# Patient Record
Sex: MALE | Race: BLACK OR AFRICAN AMERICAN | Employment: FULL TIME | URBAN - METROPOLITAN AREA
[De-identification: names, ages, dates, MRNs, and addresses within clinical notes are randomized per-mention and may not be internally consistent; named-entity substitution may affect disease eponyms.]

---

## 2017-04-05 ENCOUNTER — HOSPITAL ENCOUNTER (EMERGENCY)
Age: 27
Discharge: HOME | End: 2017-04-05
Attending: EMERGENCY MEDICINE
Payer: COMMERCIAL

## 2017-04-05 VITALS
HEART RATE: 77 BPM | DIASTOLIC BLOOD PRESSURE: 78 MMHG | OXYGEN SATURATION: 99 % | RESPIRATION RATE: 19 BRPM | SYSTOLIC BLOOD PRESSURE: 144 MMHG | WEIGHT: 230 LBS | BODY MASS INDEX: 36.1 KG/M2 | HEIGHT: 67 IN | TEMPERATURE: 98 F

## 2017-04-05 DIAGNOSIS — K08.89 PAIN, DENTAL: ICD-10-CM

## 2017-04-05 DIAGNOSIS — S02.5XXA CLOSED FRACTURE OF TOOTH, INITIAL ENCOUNTER: Primary | ICD-10-CM

## 2017-04-05 PROCEDURE — 74011000250 HC RX REV CODE- 250: Performed by: EMERGENCY MEDICINE

## 2017-04-05 PROCEDURE — 99283 EMERGENCY DEPT VISIT LOW MDM: CPT

## 2017-04-05 PROCEDURE — 74011250637 HC RX REV CODE- 250/637: Performed by: EMERGENCY MEDICINE

## 2017-04-05 RX ORDER — PENICILLIN V POTASSIUM 500 MG/1
500 TABLET, FILM COATED ORAL 4 TIMES DAILY
Qty: 28 TAB | Refills: 0 | Status: SHIPPED | OUTPATIENT
Start: 2017-04-05 | End: 2017-04-12

## 2017-04-05 RX ORDER — NAPROXEN 250 MG/1
250 TABLET ORAL ONCE
Status: DISCONTINUED | OUTPATIENT
Start: 2017-04-05 | End: 2017-04-05

## 2017-04-05 RX ORDER — HYDROCODONE BITARTRATE AND ACETAMINOPHEN 5; 325 MG/1; MG/1
1 TABLET ORAL
Qty: 12 TAB | Refills: 0 | Status: SHIPPED | OUTPATIENT
Start: 2017-04-05 | End: 2020-10-30

## 2017-04-05 RX ADMIN — LIDOCAINE HYDROCHLORIDE: 20 SOLUTION ORAL; TOPICAL at 11:26

## 2017-04-05 NOTE — ED NOTES
Patient (s)  given copy of dc instructions and 2 paper script(s) and 0 electronic scripts. Patient (s)  verbalized understanding of instructions and script (s). Patient given a current medication reconciliation form and verbalized understanding of their medications. Patient (s) verbalized understanding of the importance of discussing medications with his or her physician or clinic they will be following up with. Patient alert and oriented and in no acute distress. Patient offered wheelchair from treatment area to hospital entrance, patient denied wheelchair.

## 2017-04-05 NOTE — ED NOTES
Emergency Department Nursing Plan of Care       The Nursing Plan of Care is developed from the Nursing assessment and Emergency Department Attending provider initial evaluation. The plan of care may be reviewed in the ED Provider note.     The Plan of Care was developed with the following considerations:   Patient / Family readiness to learn indicated by:verbalized understanding  Persons(s) to be included in education: patient  Barriers to Learning/Limitations:No    Signed     Gricel Ramírez RN    4/5/2017   10:40 AM

## 2017-04-05 NOTE — ED PROVIDER NOTES
HPI Comments: Olga Villanueva is a 27 y.o. male presenting ambulatory to [facility] ED with c/o 10/10 left sided constant upper dental pain. The pt states that he chipped a tooth while eating popcorn a couple of weeks ago and has been experiencing increased pain in the tooth since then. He reports taking ibuprofen with no relief of his sx (last dose this morning). He states that he does not have a dentist that he sees regularly but notes that he will go to [facility] during walk in hours in a couple of days to have the tooth pulled. He denies having difficulty swallowing. Social History:  (-) Tobacco,   (-) EtOH,      (-) Drugs     There are no other complaints, changes, or physical findings at this time. The history is provided by the patient. History reviewed. No pertinent past medical history. History reviewed. No pertinent surgical history. History reviewed. No pertinent family history. Social History     Social History    Marital status: SINGLE     Spouse name: N/A    Number of children: N/A    Years of education: N/A     Occupational History    Not on file. Social History Main Topics    Smoking status: Never Smoker    Smokeless tobacco: Not on file    Alcohol use No    Drug use: No    Sexual activity: Yes     Partners: Male     Other Topics Concern    Not on file     Social History Narrative    No narrative on file         ALLERGIES: Sulfur    Review of Systems   Constitutional: Negative. Negative for chills and fever. HENT: Positive for dental problem. Negative for congestion, rhinorrhea and trouble swallowing. Eyes: Negative for visual disturbance. Respiratory: Negative for cough and shortness of breath. Cardiovascular: Negative for chest pain. Gastrointestinal: Negative for abdominal pain, diarrhea and vomiting. Genitourinary: Negative for difficulty urinating and dysuria. Musculoskeletal: Negative for arthralgias and back pain.    Skin: Negative for rash.   Neurological: Negative. Negative for dizziness, light-headedness and headaches. All other systems reviewed and are negative. Vitals:    04/05/17 1013 04/05/17 1132   BP: (!) 151/91 144/78   Pulse: 72 77   Resp: 19    Temp: 98 °F (36.7 °C)    SpO2: 99%    Weight: 104.3 kg (230 lb)    Height: 5' 7\" (1.702 m)             Physical Exam   Constitutional: He is oriented to person, place, and time. He appears well-developed and well-nourished. HENT:   Right Ear: Tympanic membrane, external ear and ear canal normal.   Left Ear: Tympanic membrane, external ear and ear canal normal.   Mouth/Throat: Oropharynx is clear and moist.   Left upper molar tenderness. No gum or facial swelling is noted. Cardiovascular: Normal rate, regular rhythm, normal heart sounds and intact distal pulses. Pulmonary/Chest: Effort normal and breath sounds normal.   Abdominal: Soft. There is no tenderness. Neurological: He is alert and oriented to person, place, and time. Skin: Skin is warm and dry. Nursing note and vitals reviewed. MDM  Number of Diagnoses or Management Options  Closed fracture of tooth, initial encounter:   Pain, dental:   Diagnosis management comments:   DDx: dental gabbie, dental abscess, fractured tooth       Amount and/or Complexity of Data Reviewed  Review and summarize past medical records: yes    Patient Progress  Patient progress: stable    ED Course       Procedures    11:35 AM  [facility]'s final results have been reviewed with him. He has been counseled regarding his diagnosis. He verbally conveys understanding and agreement of the signs, symptoms, diagnosis, treatment and prognosis . MEDICATIONS GIVEN:  Medications   dental ball (lidocaine/Benadryl/Cetacaine) mixture ( Mucous Membrane Given 4/5/17 1126)       IMPRESSION:  1. Closed fracture of tooth, initial encounter    2. Pain, dental        PLAN:  1.    Discharge Medication List as of 4/5/2017 11:19 AM START taking these medications    Details   HYDROcodone-acetaminophen (NORCO) 5-325 mg per tablet Take 1 Tab by mouth every eight (8) hours as needed for Pain. Max Daily Amount: 3 Tabs., Print, Disp-12 Tab, R-0      penicillin v potassium (VEETID) 500 mg tablet Take 1 Tab by mouth four (4) times daily for 7 days. , Print, Disp-28 Tab, R-0           2. Follow-up Information     Follow up With Details Comments Contact Info    [facility] [place] EM[facility]ENCY DEPT  If symptoms worsen [address] Schedule an appointment as soon as possible for a visit today  [address] [place] [address] Road  [phone]        Return to ED if worse     DISCHARGE NOTE  11:39 PM  The patient has been re-evaluated and is ready for discharge. Reviewed available results with patient. Counseled pt on diagnosis and care plan. Pt has expressed understanding, and all questions have been answered. Pt agrees with plan and agrees to F/U as recommended, or return to the ED if their sxs worsen. Discharge instructions have been provided and explained to the pt, along with reasons to return to the ED. This note is prepared by Jamie Mcallister, acting as Scribe for Genaro Wilkes MD.    Genaro Wilkes MD: The scribe's documentation has been prepared under my direction and personally reviewed by me in its entirety. I confirm that the note above accurately reflects all work, treatment, procedures, and medical decision making performed by me.

## 2017-04-05 NOTE — DISCHARGE INSTRUCTIONS
Broken Tooth: Care Instructions  Your Care Instructions  A tooth can be chipped, broken, or knocked out during sports, an accident, or a bad fall. Your doctor may have fixed your tooth temporarily. You also may have been given pain medicine. If you had signs of infection, you may need to take antibiotics. You will need to see a dentist. If you have chipped a tooth, it may be jagged, which can irritate your mouth and tongue. The dentist may smooth the edges and fill in the part that chipped off. A permanent tooth that has been knocked out can be put back in (reimplanted) if it is done quickly. The dentist may need to put a crown on a broken tooth to cover the tooth and hold it together. Prompt dental treatment can often prevent infection in the tooth. Follow-up care is a key part of your treatment and safety. Be sure to make and go to all appointments, and call your doctor if you are having problems. It's also a good idea to know your test results and keep a list of the medicines you take. How can you care for yourself at home? · If your tooth pulp is exposed, you can protect it by putting temporary filling material over the broken area. You can buy temporary filling mixes in drugstores. Follow the directions on the label. · To relieve pain and swelling, put ice or a cold cloth on the tooth's gum or cheek area, or suck on a piece of ice. But if the tooth's nerve or pulp is exposed, avoid putting anything too hot or cold near the tooth until you see your dentist.  · Ask your doctor if you can take an over-the-counter pain medicine, such as acetaminophen (Tylenol), ibuprofen (Advil, Motrin), or naproxen (Aleve). Be safe with medicines. Read and follow all instructions on the label. · If your doctor prescribed antibiotics, take them as directed. Do not stop taking them just because you feel better. You need to take the full course of antibiotics.   · To help healing, rinse your mouth with warm salt water right after meals. To make a saltwater solution, mix 1 teaspoon of salt in 1 cup of warm water. · Eat soft foods that are easy to chew. · Avoid foods that might sting, such as salty or spicy foods, citrus fruits, and tomatoes. · Do not smoke or use spit tobacco. Tobacco can slow healing in your mouth. If you need help quitting, talk to your doctor about stop-smoking programs and medicines. These can increase your chances of quitting for good. · If your tooth is loose, be gentle when you brush or floss. But be sure to brush your teeth at least two times a day, and floss at least once a day. When should you call for help? Call your doctor now or seek immediate medical care if:  · You have signs of infection, such as:  ¨ Increased pain or swelling in your mouth. ¨ Red streaks leading from the gum tissue around the tooth. ¨ Pus draining from the area around the tooth. ¨ A fever. · You have pain and swelling after chipping or breaking a tooth. · You have bleeding near a tooth. · You are not able to open or close your mouth normally. Watch closely for changes in your health, and be sure to contact your doctor if:  · Your tooth is sensitive to heat, cold, air, or sweets. · You do not get better as expected. Where can you learn more? Go to http://brie-leticia.info/. Enter W162 in the search box to learn more about \"Broken Tooth: Care Instructions. \"  Current as of: August 9, 2016  Content Version: 11.2  © 3121-9660 [employer]. Care instructions adapted under license by [employer] (which disclaims liability or warranty for this information). If you have questions about a medical condition or this instruction, always ask your healthcare professional. [place] [address] any warranty or liability for your use of this information. Learning About Dental Care  What is basic dental care?     Basic dental care involves brushing and flossing your teeth regularly to remove plaque. Plaque is a thin film of bacteria that sticks to teeth above and below the gum line. It can build up and harden into tartar, which makes it harder to give the teeth a good cleaning. Tartar usually has to be removed by a dental hygienist.  The bacteria in plaque use sugars to make acids. These acids can damage the gums and teeth. Be sure to see your dentist and dental hygienist for regular checkups and cleanings. How can dental care affect your health? Practicing basic dental care:  · Removes plaque that can cause gum disease and tooth decay. Tooth decay can lead to a hole in the tooth (cavity). · Helps prevent bad breath. Brushing and flossing rid your mouth of the bacteria that cause bad breath. · Helps keep teeth white by preventing staining from food, drinks, and tobacco.  · Makes it possible for your teeth to last a lifetime. What can you do to prevent dental problems? · Brush your teeth twice a day, in the morning and at night. ¨ Use a toothbrush with soft, rounded-end bristles and a head that is small enough to reach all parts of your teeth and mouth. ¨ Replace your toothbrush every 3 to 4 months. ¨ Use a fluoride toothpaste. ¨ Place the brush at a 45-degree angle where the teeth meet the gums. Press firmly, and gently rock the brush back and forth using small circular movements. ¨ Brush chewing surfaces vigorously with short back-and-forth strokes. ¨ Brush your tongue from back to front. · Floss at least once a day. Choose the type and flavor you like best.  · Schedule checkups and cleanings as often as your dentist recommends it. · Eat a healthy diet to help keep your gums healthy and your teeth strong. Choose foods that are good for your teeth, such as whole grains, vegetables, fruits, and foods that are low in saturated fat and sodium. Mozzarella and other cheeses, peanuts, yogurt, and milk are good for your teeth.  Sugar-free chewing gum (especially gum that contains xylitol) is also a good choice. · Avoid foods that contain a lot of sugar, especially sticky, sweet foods like taffy. · Don't snack before bedtime. Food left on the teeth is more likely to cause tooth decay overnight. · Don't smoke or use smokeless tobacco. Tobacco can make tooth decay worse. If you need help quitting, talk to your doctor about stop-smoking programs and medicines. These can increase your chances of quitting for good. Where can you learn more? Go to http://brie-leticia.info/. Enter V361 in the search box to learn more about \"Learning About Dental Care. \"  Current as of: August 9, 2016  Content Version: 11.2  © 0149-3839 [employer]. Care instructions adapted under license by [employer] (which disclaims liability or warranty for this information). If you have questions about a medical condition or this instruction, always ask your healthcare professional. [address] any warranty or liability for your use of this information.

## 2020-10-30 ENCOUNTER — APPOINTMENT (OUTPATIENT)
Dept: GENERAL RADIOLOGY | Age: 30
DRG: 343 | End: 2020-10-30
Attending: EMERGENCY MEDICINE

## 2020-10-30 ENCOUNTER — APPOINTMENT (OUTPATIENT)
Dept: CT IMAGING | Age: 30
DRG: 343 | End: 2020-10-30
Attending: EMERGENCY MEDICINE

## 2020-10-30 ENCOUNTER — HOSPITAL ENCOUNTER (INPATIENT)
Age: 30
LOS: 2 days | Discharge: HOME | DRG: 343 | End: 2020-11-01
Attending: EMERGENCY MEDICINE | Admitting: SURGERY

## 2020-10-30 DIAGNOSIS — K35.80 ACUTE APPENDICITIS, UNSPECIFIED ACUTE APPENDICITIS TYPE: Primary | ICD-10-CM

## 2020-10-30 LAB
ALBUMIN SERPL-MCNC: 3.9 G/DL (ref 3.5–5)
ALBUMIN/GLOB SERPL: 0.9 {RATIO} (ref 1.1–2.2)
ALP SERPL-CCNC: 66 U/L (ref 45–117)
ALT SERPL-CCNC: 31 U/L (ref 12–78)
ANION GAP SERPL CALC-SCNC: 9 MMOL/L (ref 5–15)
APPEARANCE UR: CLEAR
AST SERPL W P-5'-P-CCNC: 17 U/L (ref 15–37)
BACTERIA URNS QL MICRO: NEGATIVE /HPF
BASOPHILS # BLD: 0 K/UL (ref 0–0.1)
BASOPHILS NFR BLD: 0 % (ref 0–1)
BILIRUB SERPL-MCNC: 0.4 MG/DL (ref 0.2–1)
BILIRUB UR QL: NEGATIVE
BUN SERPL-MCNC: 9 MG/DL (ref 6–20)
BUN/CREAT SERPL: 8 (ref 12–20)
CA-I BLD-MCNC: 9.1 MG/DL (ref 8.5–10.1)
CHLORIDE SERPL-SCNC: 105 MMOL/L (ref 97–108)
CO2 SERPL-SCNC: 29 MMOL/L (ref 21–32)
COLOR UR: ABNORMAL
CREAT SERPL-MCNC: 1.14 MG/DL (ref 0.7–1.3)
DIFFERENTIAL METHOD BLD: ABNORMAL
EOSINOPHIL # BLD: 0 K/UL (ref 0–0.4)
EOSINOPHIL NFR BLD: 0 % (ref 0–7)
EPITH CASTS URNS QL MICRO: ABNORMAL /LPF
ERYTHROCYTE [DISTWIDTH] IN BLOOD BY AUTOMATED COUNT: 12.6 % (ref 11.5–14.5)
GLOBULIN SER CALC-MCNC: 4.4 G/DL (ref 2–4)
GLUCOSE SERPL-MCNC: 106 MG/DL (ref 65–100)
GLUCOSE UR STRIP.AUTO-MCNC: NEGATIVE MG/DL
HCT VFR BLD AUTO: 46.6 % (ref 36.6–50.3)
HGB BLD-MCNC: 15.6 G/DL (ref 12.1–17)
HGB UR QL STRIP: NEGATIVE
IMM GRANULOCYTES # BLD AUTO: 0 K/UL (ref 0–0.04)
IMM GRANULOCYTES NFR BLD AUTO: 0 % (ref 0–0.5)
KETONES UR QL STRIP.AUTO: NEGATIVE MG/DL
LEUKOCYTE ESTERASE UR QL STRIP.AUTO: NEGATIVE
LIPASE SERPL-CCNC: 129 U/L (ref 73–393)
LYMPHOCYTES # BLD: 1.4 K/UL (ref 0.8–3.5)
LYMPHOCYTES NFR BLD: 14 % (ref 12–49)
MCH RBC QN AUTO: 28.7 PG (ref 26–34)
MCHC RBC AUTO-ENTMCNC: 33.5 G/DL (ref 30–36.5)
MCV RBC AUTO: 85.7 FL (ref 80–99)
MONOCYTES # BLD: 0.5 K/UL (ref 0–1)
MONOCYTES NFR BLD: 6 % (ref 5–13)
NEUTS SEG # BLD: 7.5 K/UL (ref 1.8–8)
NEUTS SEG NFR BLD: 80 % (ref 32–75)
NITRITE UR QL STRIP.AUTO: NEGATIVE
PH UR STRIP: 8 [PH] (ref 5–8)
PLATELET # BLD AUTO: 209 K/UL (ref 150–400)
PMV BLD AUTO: 10.4 FL (ref 8.9–12.9)
POTASSIUM SERPL-SCNC: 3.5 MMOL/L (ref 3.5–5.1)
PROT SERPL-MCNC: 8.3 G/DL (ref 6.4–8.2)
PROT UR STRIP-MCNC: NEGATIVE MG/DL
RBC # BLD AUTO: 5.44 M/UL (ref 4.1–5.7)
RBC #/AREA URNS HPF: ABNORMAL /HPF (ref 0–5)
SODIUM SERPL-SCNC: 143 MMOL/L (ref 136–145)
SP GR UR REFRACTOMETRY: 1.01 (ref 1–1.03)
UROBILINOGEN UR QL STRIP.AUTO: 0.1 EU/DL (ref 0.2–1)
WBC # BLD AUTO: 9.4 K/UL (ref 4.1–11.1)
WBC URNS QL MICRO: ABNORMAL /HPF (ref 0–4)

## 2020-10-30 PROCEDURE — 83690 ASSAY OF LIPASE: CPT

## 2020-10-30 PROCEDURE — 85025 COMPLETE CBC W/AUTO DIFF WBC: CPT

## 2020-10-30 PROCEDURE — 74011000258 HC RX REV CODE- 258: Performed by: EMERGENCY MEDICINE

## 2020-10-30 PROCEDURE — 65270000029 HC RM PRIVATE

## 2020-10-30 PROCEDURE — 74019 RADEX ABDOMEN 2 VIEWS: CPT

## 2020-10-30 PROCEDURE — 80053 COMPREHEN METABOLIC PANEL: CPT

## 2020-10-30 PROCEDURE — 96375 TX/PRO/DX INJ NEW DRUG ADDON: CPT

## 2020-10-30 PROCEDURE — 81001 URINALYSIS AUTO W/SCOPE: CPT

## 2020-10-30 PROCEDURE — 74011250636 HC RX REV CODE- 250/636: Performed by: EMERGENCY MEDICINE

## 2020-10-30 PROCEDURE — 74176 CT ABD & PELVIS W/O CONTRAST: CPT

## 2020-10-30 PROCEDURE — 96374 THER/PROPH/DIAG INJ IV PUSH: CPT

## 2020-10-30 PROCEDURE — 99284 EMERGENCY DEPT VISIT MOD MDM: CPT

## 2020-10-30 RX ORDER — KETOROLAC TROMETHAMINE 30 MG/ML
30 INJECTION, SOLUTION INTRAMUSCULAR; INTRAVENOUS
Status: COMPLETED | OUTPATIENT
Start: 2020-10-30 | End: 2020-10-30

## 2020-10-30 RX ORDER — ONDANSETRON 2 MG/ML
4 INJECTION INTRAMUSCULAR; INTRAVENOUS
Status: COMPLETED | OUTPATIENT
Start: 2020-10-30 | End: 2020-10-30

## 2020-10-30 RX ADMIN — KETOROLAC TROMETHAMINE 30 MG: 30 INJECTION, SOLUTION INTRAMUSCULAR at 17:15

## 2020-10-30 RX ADMIN — PIPERACILLIN AND TAZOBACTAM 3.38 G: 3; .375 INJECTION, POWDER, LYOPHILIZED, FOR SOLUTION INTRAVENOUS at 19:28

## 2020-10-30 RX ADMIN — ONDANSETRON 4 MG: 2 INJECTION INTRAMUSCULAR; INTRAVENOUS at 17:15

## 2020-10-30 RX ADMIN — SODIUM CHLORIDE 1000 ML: 9 INJECTION, SOLUTION INTRAVENOUS at 17:16

## 2020-10-30 NOTE — ED PROVIDER NOTES
EMERGENCY DEPARTMENT HISTORY AND PHYSICAL EXAM      Date: 10/30/2020  Patient Name: Jax Schwab    History of Presenting Illness     Chief Complaint   Patient presents with    Abdominal Pain    Vomiting       History Provided By: Patient    HPI: Jax Schwab, 31 y.o. male with  No significant past medical history presents to the ED with cc of right lower quadrant pain over the use onset this morning progressively worse associated with nausea vomiting. No relieving factors. No other constitutional symptoms. No history of kidney stones in the past.    There are no other complaints, changes, or physical findings at this time. PCP: UNKNOWN    No current facility-administered medications on file prior to encounter. Current Outpatient Medications on File Prior to Encounter   Medication Sig Dispense Refill    [DISCONTINUED] HYDROcodone-acetaminophen (NORCO) 5-325 mg per tablet Take 1 Tab by mouth every eight (8) hours as needed for Pain. Max Daily Amount: 3 Tabs. 12 Tab 0       Past History     Past Medical History:  History reviewed. No pertinent past medical history. Past Surgical History:  History reviewed. No pertinent surgical history. Family History:  History reviewed. No pertinent family history. Social History:  Social History     Tobacco Use    Smoking status: Never Smoker    Smokeless tobacco: Never Used   Substance Use Topics    Alcohol use: Not Currently    Drug use: No       Allergies: Allergies   Allergen Reactions    Sulfur Hives and Swelling         Review of Systems     Review of Systems   Constitutional: Negative. HENT: Negative. Eyes: Negative. Respiratory: Negative. Cardiovascular: Negative. Gastrointestinal: Positive for abdominal pain. Pain localized to the right lower quadrant   Endocrine: Negative. Genitourinary: Negative. Musculoskeletal: Negative. Skin: Negative. Allergic/Immunologic: Negative. Neurological: Negative. Hematological: Negative. Psychiatric/Behavioral: Negative. All other systems reviewed and are negative. Physical Exam     Physical Exam  Vitals signs and nursing note reviewed. Constitutional:       General: He is not in acute distress. Appearance: He is well-developed. He is not toxic-appearing or diaphoretic. HENT:      Head: Normocephalic and atraumatic. Nose: Nose normal.      Mouth/Throat:      Mouth: Mucous membranes are moist.      Pharynx: Oropharynx is clear. Eyes:      Extraocular Movements: Extraocular movements intact. Pupils: Pupils are equal, round, and reactive to light. Neck:      Musculoskeletal: Normal range of motion and neck supple. Cardiovascular:      Rate and Rhythm: Normal rate and regular rhythm. Heart sounds: Normal heart sounds. Pulmonary:      Effort: Pulmonary effort is normal. No respiratory distress. Breath sounds: Normal breath sounds. Chest:      Chest wall: No mass or tenderness. Abdominal:      General: Bowel sounds are normal. There is no abdominal bruit. Palpations: Abdomen is soft. There is no hepatomegaly. Tenderness: There is abdominal tenderness in the right lower quadrant. There is guarding. There is no rebound. Positive signs include McBurney's sign. Negative signs include Muir's sign. Musculoskeletal: Normal range of motion. Right lower leg: He exhibits no tenderness. No edema. Left lower leg: He exhibits no tenderness. No edema. Skin:     General: Skin is warm and dry. Capillary Refill: Capillary refill takes less than 2 seconds. Neurological:      General: No focal deficit present. Mental Status: He is alert and oriented to person, place, and time.    Psychiatric:         Mood and Affect: Mood normal.         Behavior: Behavior normal.         Lab and Diagnostic Study Results     Labs -     Recent Results (from the past 12 hour(s))   URINALYSIS W/MICROSCOPIC    Collection Time: 10/30/20  4:45 PM   Result Value Ref Range    Color Yellow/Straw      Appearance Clear Clear      Specific gravity 1.010 1.003 - 1.030      pH (UA) 8.0 5.0 - 8.0      Protein Negative Negative mg/dL    Glucose Negative Negative mg/dL    Ketone Negative Negative mg/dL    Bilirubin Negative Negative      Blood Negative Negative      Urobilinogen 0.1 (L) 0.2 - 1.0 EU/dL    Nitrites Negative Negative      Leukocyte Esterase Negative Negative      WBC 0-4 0 - 4 /hpf    RBC 0-5 0 - 5 /hpf    Epithelial cells Few Few /lpf    Bacteria Negative Negative /hpf   CBC WITH AUTOMATED DIFF    Collection Time: 10/30/20  4:45 PM   Result Value Ref Range    WBC 9.4 4.1 - 11.1 K/uL    RBC 5.44 4.10 - 5.70 M/uL    HGB 15.6 12.1 - 17.0 g/dL    HCT 46.6 36.6 - 50.3 %    MCV 85.7 80.0 - 99.0 FL    MCH 28.7 26.0 - 34.0 PG    MCHC 33.5 30.0 - 36.5 g/dL    RDW 12.6 11.5 - 14.5 %    PLATELET 123 444 - 711 K/uL    MPV 10.4 8.9 - 12.9 FL    NEUTROPHILS 80 (H) 32 - 75 %    LYMPHOCYTES 14 12 - 49 %    MONOCYTES 6 5 - 13 %    EOSINOPHILS 0 0 - 7 %    BASOPHILS 0 0 - 1 %    IMMATURE GRANULOCYTES 0 0.0 - 0.5 %    ABS. NEUTROPHILS 7.5 1.8 - 8.0 K/UL    ABS. LYMPHOCYTES 1.4 0.8 - 3.5 K/UL    ABS. MONOCYTES 0.5 0.0 - 1.0 K/UL    ABS. EOSINOPHILS 0.0 0.0 - 0.4 K/UL    ABS. BASOPHILS 0.0 0.0 - 0.1 K/UL    ABS. IMM.  GRANS. 0.0 0.00 - 0.04 K/UL    DF AUTOMATED     METABOLIC PANEL, COMPREHENSIVE    Collection Time: 10/30/20  4:45 PM   Result Value Ref Range    Sodium 143 136 - 145 mmol/L    Potassium 3.5 3.5 - 5.1 mmol/L    Chloride 105 97 - 108 mmol/L    CO2 29 21 - 32 mmol/L    Anion gap 9 5 - 15 mmol/L    Glucose 106 (H) 65 - 100 mg/dL    BUN 9 6 - 20 mg/dL    Creatinine 1.14 0.70 - 1.30 mg/dL    BUN/Creatinine ratio 8 (L) 12 - 20      GFR est AA >60 >60 ml/min/1.73m2    GFR est non-AA >60 >60 ml/min/1.73m2    Calcium 9.1 8.5 - 10.1 mg/dL    Bilirubin, total 0.4 0.2 - 1.0 mg/dL    AST (SGOT) 17 15 - 37 U/L    ALT (SGPT) 31 12 - 78 U/L    Alk. phosphatase 66 45 - 117 U/L    Protein, total 8.3 (H) 6.4 - 8.2 g/dL    Albumin 3.9 3.5 - 5.0 g/dL    Globulin 4.4 (H) 2.0 - 4.0 g/dL    A-G Ratio 0.9 (L) 1.1 - 2.2     LIPASE    Collection Time: 10/30/20  5:15 PM   Result Value Ref Range    Lipase 129 73 - 393 U/L       Radiologic Studies -     CT Results  (Last 48 hours)               10/30/20 1826  CT ABD PELV WO CONT Final result    Impression:  IMPRESSION: Findings most likely represent early acute appendicitis without   perforation or abscess. Dose reduction: All CT scans at this facility are performed using radiation dose   reduction optimization techniques as appropriate to a performed exam, including   the following: Automated exposure control (8 cc), adjustment of the MAA and/or   KUB according to the patient's size, or the patient's use of iterative   reconstruction techniques (ASIR). Narrative:  CT scan the abdomen and pelvis is performed without IV or oral contrast per   renal colic protocol. Coronal reconstructions are generated. Comparison exams   are not available. Findings: The lung bases are normal. The solid abdominal organs are grossly   normal, given the limited sensitivity of a non-IV contrast exam.       There is no free intraperitoneal fluid or gas. The bowel is unopacified but   normal in caliber. No adenopathy is noted. There are no urinary tract calculi or   evidence of urinary tract obstruction. The appendix appears abnormal. It is mildly enlarged (9 mm) and exhibits some   mild surrounding soft tissue stranding. This may represent early acute   appendicitis. No perforation or abscess is identified. There is no free fluid or mass in the cul-de-sac. Bone windows reveal a bone   island or osteoma in the right inferior pubic ramus/ischial tuberosity. CXR Results  (Last 48 hours)    None            Medical Decision Making   I am the first provider for this patient.     I reviewed the vital signs, available nursing notes, past medical history, past surgical history, family history and social history. Vital Signs-Reviewed the patient's vital signs. Patient Vitals for the past 12 hrs:   Temp Pulse Resp BP   10/30/20 1627 97.9 °F (36.6 °C) 71 17 (!) 144/79       Records Reviewed: Nursing Notes    ED Course:   Initial assessment performed. The patients presenting problems have been discussed, and they are in agreement with the care plan formulated and outlined with them. I have encouraged them to ask questions as they arise throughout their visit. Provider Notes (Medical Decision Making): Patient is to be admitted to Dr. Chambers Filter service, surgeon for further evaluation treatment and operative intervention      Disposition   Disposition: 1900 Awaiting callback from Dr. Christin Dye for admission to the surgical service. Signed out to Dr. Martha Urban for final disposition/discussion with Dr. Christin Dye       Admitted  2. Follow-up Information    None       3. Return to ED if worse   4. There are no discharge medications for this patient. Diagnosis     Clinical Impression:   1. Acute appendicitis, unspecified acute appendicitis type        Attestations:    Tashi Smith MD    Please note that this dictation was completed with [employer], the computer voice recognition software. Quite often unanticipated grammatical, syntax, homophones, and other interpretive errors are inadvertently transcribed by the computer software. Please disregard these errors. Please excuse any errors that have escaped final proofreading. Thank you.

## 2020-10-30 NOTE — ED TRIAGE NOTES
Started suddenly after BM (normal), voiding normal this morning at 11am.  RLQ, sharp constant pain, 9/10. Hasn't taken anything and never happened before. Denies anyother symptoms except vomited x 2 since 11 and nauseated.

## 2020-10-31 ENCOUNTER — ANESTHESIA EVENT (OUTPATIENT)
Dept: SURGERY | Age: 30
DRG: 343 | End: 2020-10-31

## 2020-10-31 ENCOUNTER — ANESTHESIA (OUTPATIENT)
Dept: SURGERY | Age: 30
DRG: 343 | End: 2020-10-31

## 2020-10-31 PROCEDURE — 74011000258 HC RX REV CODE- 258: Performed by: NURSE ANESTHETIST, CERTIFIED REGISTERED

## 2020-10-31 PROCEDURE — 76060000036 HC ANESTHESIA 2.5 TO 3 HR: Performed by: SURGERY

## 2020-10-31 PROCEDURE — 74011250636 HC RX REV CODE- 250/636: Performed by: ANESTHESIOLOGY

## 2020-10-31 PROCEDURE — 77030009403 HC ELECTRD ENDO MEGA -B: Performed by: SURGERY

## 2020-10-31 PROCEDURE — 77030002933 HC SUT MCRYL J&J -A: Performed by: SURGERY

## 2020-10-31 PROCEDURE — 65270000029 HC RM PRIVATE

## 2020-10-31 PROCEDURE — 0DTJ4ZZ RESECTION OF APPENDIX, PERCUTANEOUS ENDOSCOPIC APPROACH: ICD-10-PCS | Performed by: SURGERY

## 2020-10-31 PROCEDURE — 77030008756 HC TU IRR SUC STRY -B: Performed by: SURGERY

## 2020-10-31 PROCEDURE — 74011000250 HC RX REV CODE- 250: Performed by: SURGERY

## 2020-10-31 PROCEDURE — 77030010507 HC ADH SKN DERMBND J&J -B: Performed by: SURGERY

## 2020-10-31 PROCEDURE — 77030022703 HC LIGASURE  BLNT LAPSCP SEAL COVD -E: Performed by: SURGERY

## 2020-10-31 PROCEDURE — 74011000272 HC RX REV CODE- 272: Performed by: SURGERY

## 2020-10-31 PROCEDURE — 74011000258 HC RX REV CODE- 258: Performed by: SURGERY

## 2020-10-31 PROCEDURE — 76010000132 HC OR TIME 2.5 TO 3 HR: Performed by: SURGERY

## 2020-10-31 PROCEDURE — 74011250636 HC RX REV CODE- 250/636: Performed by: SURGERY

## 2020-10-31 PROCEDURE — 2709999900 HC NON-CHARGEABLE SUPPLY: Performed by: SURGERY

## 2020-10-31 PROCEDURE — 88304 TISSUE EXAM BY PATHOLOGIST: CPT

## 2020-10-31 PROCEDURE — 77030018706 HC CORD MPLR COVD -A: Performed by: SURGERY

## 2020-10-31 PROCEDURE — 77030041703 HC SLV COMPR DVT ARJO -B: Performed by: SURGERY

## 2020-10-31 PROCEDURE — 77030008518 HC TBNG INSUF ENDO STRY -B: Performed by: SURGERY

## 2020-10-31 PROCEDURE — 77030008606 HC TRCR ENDOSC KII AMR -B: Performed by: SURGERY

## 2020-10-31 PROCEDURE — 77030027876 HC STPLR ENDOSC FLX PWR J&J -G1: Performed by: SURGERY

## 2020-10-31 PROCEDURE — 74011250636 HC RX REV CODE- 250/636: Performed by: EMERGENCY MEDICINE

## 2020-10-31 PROCEDURE — 77030031139 HC SUT VCRL2 J&J -A: Performed by: SURGERY

## 2020-10-31 PROCEDURE — 74011250636 HC RX REV CODE- 250/636: Performed by: NURSE ANESTHETIST, CERTIFIED REGISTERED

## 2020-10-31 PROCEDURE — 77030018813 HC SCIS LAPSCP EPIX DISP AMR -B: Performed by: SURGERY

## 2020-10-31 PROCEDURE — 77030018684: Performed by: SURGERY

## 2020-10-31 PROCEDURE — 74011000250 HC RX REV CODE- 250: Performed by: NURSE ANESTHETIST, CERTIFIED REGISTERED

## 2020-10-31 PROCEDURE — 77030016151 HC PROTCTR LNS DFOG COVD -B: Performed by: SURGERY

## 2020-10-31 PROCEDURE — 76210000016 HC OR PH I REC 1 TO 1.5 HR: Performed by: SURGERY

## 2020-10-31 PROCEDURE — 77030009851 HC PCH RTVR ENDOSC AMR -B: Performed by: SURGERY

## 2020-10-31 RX ORDER — SODIUM CHLORIDE, SODIUM LACTATE, POTASSIUM CHLORIDE, CALCIUM CHLORIDE 600; 310; 30; 20 MG/100ML; MG/100ML; MG/100ML; MG/100ML
125 INJECTION, SOLUTION INTRAVENOUS CONTINUOUS
Status: DISCONTINUED | OUTPATIENT
Start: 2020-10-31 | End: 2020-11-01 | Stop reason: HOSPADM

## 2020-10-31 RX ORDER — MORPHINE SULFATE 2 MG/ML
1 INJECTION, SOLUTION INTRAMUSCULAR; INTRAVENOUS
Status: DISCONTINUED | OUTPATIENT
Start: 2020-10-31 | End: 2020-11-01 | Stop reason: HOSPADM

## 2020-10-31 RX ORDER — SODIUM CHLORIDE, SODIUM LACTATE, POTASSIUM CHLORIDE, CALCIUM CHLORIDE 600; 310; 30; 20 MG/100ML; MG/100ML; MG/100ML; MG/100ML
INJECTION, SOLUTION INTRAVENOUS
Status: DISCONTINUED | OUTPATIENT
Start: 2020-10-31 | End: 2020-10-31 | Stop reason: HOSPADM

## 2020-10-31 RX ORDER — PROPOFOL 10 MG/ML
INJECTION, EMULSION INTRAVENOUS AS NEEDED
Status: DISCONTINUED | OUTPATIENT
Start: 2020-10-31 | End: 2020-10-31 | Stop reason: HOSPADM

## 2020-10-31 RX ORDER — BUPIVACAINE HYDROCHLORIDE 2.5 MG/ML
INJECTION, SOLUTION EPIDURAL; INFILTRATION; INTRACAUDAL AS NEEDED
Status: DISCONTINUED | OUTPATIENT
Start: 2020-10-31 | End: 2020-10-31 | Stop reason: HOSPADM

## 2020-10-31 RX ORDER — MIDAZOLAM HYDROCHLORIDE 1 MG/ML
1 INJECTION, SOLUTION INTRAMUSCULAR; INTRAVENOUS AS NEEDED
Status: CANCELLED | OUTPATIENT
Start: 2020-10-31

## 2020-10-31 RX ORDER — HYDROMORPHONE HYDROCHLORIDE 1 MG/ML
0.4 INJECTION, SOLUTION INTRAMUSCULAR; INTRAVENOUS; SUBCUTANEOUS
Status: DISCONTINUED | OUTPATIENT
Start: 2020-10-31 | End: 2020-10-31 | Stop reason: HOSPADM

## 2020-10-31 RX ORDER — SUCCINYLCHOLINE CHLORIDE 20 MG/ML
INJECTION INTRAMUSCULAR; INTRAVENOUS AS NEEDED
Status: DISCONTINUED | OUTPATIENT
Start: 2020-10-31 | End: 2020-10-31 | Stop reason: HOSPADM

## 2020-10-31 RX ORDER — SODIUM CHLORIDE 0.9 % (FLUSH) 0.9 %
5-40 SYRINGE (ML) INJECTION EVERY 8 HOURS
Status: DISCONTINUED | OUTPATIENT
Start: 2020-10-31 | End: 2020-10-31 | Stop reason: HOSPADM

## 2020-10-31 RX ORDER — SODIUM CHLORIDE 0.9 % (FLUSH) 0.9 %
5-40 SYRINGE (ML) INJECTION EVERY 8 HOURS
Status: CANCELLED | OUTPATIENT
Start: 2020-10-31

## 2020-10-31 RX ORDER — MORPHINE SULFATE 10 MG/ML
INJECTION, SOLUTION INTRAMUSCULAR; INTRAVENOUS AS NEEDED
Status: DISCONTINUED | OUTPATIENT
Start: 2020-10-31 | End: 2020-10-31 | Stop reason: HOSPADM

## 2020-10-31 RX ORDER — ONDANSETRON 2 MG/ML
4 INJECTION INTRAMUSCULAR; INTRAVENOUS
Status: COMPLETED | OUTPATIENT
Start: 2020-10-31 | End: 2020-10-31

## 2020-10-31 RX ORDER — FENTANYL CITRATE 50 UG/ML
50 INJECTION, SOLUTION INTRAMUSCULAR; INTRAVENOUS AS NEEDED
Status: CANCELLED | OUTPATIENT
Start: 2020-10-31

## 2020-10-31 RX ORDER — METOPROLOL TARTRATE 5 MG/5ML
2.5 INJECTION INTRAVENOUS
Status: DISCONTINUED | OUTPATIENT
Start: 2020-10-31 | End: 2020-10-31 | Stop reason: HOSPADM

## 2020-10-31 RX ORDER — DEXAMETHASONE SODIUM PHOSPHATE 4 MG/ML
INJECTION, SOLUTION INTRA-ARTICULAR; INTRALESIONAL; INTRAMUSCULAR; INTRAVENOUS; SOFT TISSUE AS NEEDED
Status: DISCONTINUED | OUTPATIENT
Start: 2020-10-31 | End: 2020-10-31 | Stop reason: HOSPADM

## 2020-10-31 RX ORDER — MORPHINE SULFATE 10 MG/ML
2 INJECTION, SOLUTION INTRAMUSCULAR; INTRAVENOUS
Status: DISCONTINUED | OUTPATIENT
Start: 2020-10-31 | End: 2020-10-31 | Stop reason: HOSPADM

## 2020-10-31 RX ORDER — ROCURONIUM BROMIDE 10 MG/ML
INJECTION, SOLUTION INTRAVENOUS AS NEEDED
Status: DISCONTINUED | OUTPATIENT
Start: 2020-10-31 | End: 2020-10-31 | Stop reason: HOSPADM

## 2020-10-31 RX ORDER — FENTANYL CITRATE 50 UG/ML
INJECTION, SOLUTION INTRAMUSCULAR; INTRAVENOUS AS NEEDED
Status: DISCONTINUED | OUTPATIENT
Start: 2020-10-31 | End: 2020-10-31 | Stop reason: HOSPADM

## 2020-10-31 RX ORDER — FENTANYL CITRATE 50 UG/ML
50 INJECTION, SOLUTION INTRAMUSCULAR; INTRAVENOUS
Status: DISCONTINUED | OUTPATIENT
Start: 2020-10-31 | End: 2020-10-31 | Stop reason: HOSPADM

## 2020-10-31 RX ORDER — SODIUM CHLORIDE 0.9 % (FLUSH) 0.9 %
5-40 SYRINGE (ML) INJECTION AS NEEDED
Status: DISCONTINUED | OUTPATIENT
Start: 2020-10-31 | End: 2020-10-31 | Stop reason: HOSPADM

## 2020-10-31 RX ORDER — LABETALOL HCL 20 MG/4 ML
5 SYRINGE (ML) INTRAVENOUS
Status: DISCONTINUED | OUTPATIENT
Start: 2020-10-31 | End: 2020-10-31 | Stop reason: HOSPADM

## 2020-10-31 RX ORDER — MORPHINE SULFATE 2 MG/ML
1 INJECTION, SOLUTION INTRAMUSCULAR; INTRAVENOUS
Status: DISCONTINUED | OUTPATIENT
Start: 2020-10-31 | End: 2020-10-31

## 2020-10-31 RX ORDER — SODIUM CHLORIDE 0.9 G/100ML
IRRIGANT IRRIGATION AS NEEDED
Status: DISCONTINUED | OUTPATIENT
Start: 2020-10-31 | End: 2020-10-31 | Stop reason: HOSPADM

## 2020-10-31 RX ORDER — MORPHINE SULFATE 4 MG/ML
4 INJECTION INTRAVENOUS ONCE
Status: COMPLETED | OUTPATIENT
Start: 2020-10-31 | End: 2020-10-31

## 2020-10-31 RX ORDER — LIDOCAINE HYDROCHLORIDE 10 MG/ML
0.1 INJECTION, SOLUTION EPIDURAL; INFILTRATION; INTRACAUDAL; PERINEURAL AS NEEDED
Status: CANCELLED | OUTPATIENT
Start: 2020-10-31

## 2020-10-31 RX ORDER — BUPIVACAINE HYDROCHLORIDE 2.5 MG/ML
INJECTION, SOLUTION EPIDURAL; INFILTRATION; INTRACAUDAL
Status: DISPENSED
Start: 2020-10-31 | End: 2020-10-31

## 2020-10-31 RX ORDER — EPHEDRINE SULFATE/0.9% NACL/PF 50 MG/5 ML
5 SYRINGE (ML) INTRAVENOUS AS NEEDED
Status: DISCONTINUED | OUTPATIENT
Start: 2020-10-31 | End: 2020-10-31 | Stop reason: HOSPADM

## 2020-10-31 RX ORDER — HYDRALAZINE HYDROCHLORIDE 20 MG/ML
10 INJECTION INTRAMUSCULAR; INTRAVENOUS
Status: DISCONTINUED | OUTPATIENT
Start: 2020-10-31 | End: 2020-10-31 | Stop reason: HOSPADM

## 2020-10-31 RX ORDER — MIDAZOLAM HYDROCHLORIDE 1 MG/ML
0.5 INJECTION, SOLUTION INTRAMUSCULAR; INTRAVENOUS
Status: DISCONTINUED | OUTPATIENT
Start: 2020-10-31 | End: 2020-10-31 | Stop reason: HOSPADM

## 2020-10-31 RX ORDER — DIPHENHYDRAMINE HYDROCHLORIDE 50 MG/ML
12.5 INJECTION, SOLUTION INTRAMUSCULAR; INTRAVENOUS AS NEEDED
Status: DISCONTINUED | OUTPATIENT
Start: 2020-10-31 | End: 2020-10-31 | Stop reason: HOSPADM

## 2020-10-31 RX ORDER — ONDANSETRON 2 MG/ML
INJECTION INTRAMUSCULAR; INTRAVENOUS AS NEEDED
Status: DISCONTINUED | OUTPATIENT
Start: 2020-10-31 | End: 2020-10-31 | Stop reason: HOSPADM

## 2020-10-31 RX ORDER — MORPHINE SULFATE 2 MG/ML
2 INJECTION, SOLUTION INTRAMUSCULAR; INTRAVENOUS
Status: DISCONTINUED | OUTPATIENT
Start: 2020-10-31 | End: 2020-11-01 | Stop reason: HOSPADM

## 2020-10-31 RX ORDER — ONDANSETRON 2 MG/ML
4 INJECTION INTRAMUSCULAR; INTRAVENOUS
Status: DISCONTINUED | OUTPATIENT
Start: 2020-10-31 | End: 2020-11-01 | Stop reason: HOSPADM

## 2020-10-31 RX ORDER — SODIUM CHLORIDE 0.9 % (FLUSH) 0.9 %
5-40 SYRINGE (ML) INJECTION AS NEEDED
Status: CANCELLED | OUTPATIENT
Start: 2020-10-31

## 2020-10-31 RX ORDER — ONDANSETRON 2 MG/ML
4 INJECTION INTRAMUSCULAR; INTRAVENOUS AS NEEDED
Status: DISCONTINUED | OUTPATIENT
Start: 2020-10-31 | End: 2020-10-31 | Stop reason: HOSPADM

## 2020-10-31 RX ORDER — HYDROCODONE BITARTRATE AND ACETAMINOPHEN 5; 325 MG/1; MG/1
1 TABLET ORAL AS NEEDED
Status: DISCONTINUED | OUTPATIENT
Start: 2020-10-31 | End: 2020-10-31 | Stop reason: HOSPADM

## 2020-10-31 RX ORDER — MORPHINE SULFATE 2 MG/ML
1 INJECTION, SOLUTION INTRAMUSCULAR; INTRAVENOUS ONCE
Status: COMPLETED | OUTPATIENT
Start: 2020-10-31 | End: 2020-10-31

## 2020-10-31 RX ORDER — MIDAZOLAM HYDROCHLORIDE 1 MG/ML
INJECTION, SOLUTION INTRAMUSCULAR; INTRAVENOUS AS NEEDED
Status: DISCONTINUED | OUTPATIENT
Start: 2020-10-31 | End: 2020-10-31 | Stop reason: HOSPADM

## 2020-10-31 RX ADMIN — ONDANSETRON 4 MG: 2 INJECTION INTRAMUSCULAR; INTRAVENOUS at 13:00

## 2020-10-31 RX ADMIN — PIPERACILLIN AND TAZOBACTAM 3.38 G: 3; .375 INJECTION, POWDER, LYOPHILIZED, FOR SOLUTION INTRAVENOUS at 01:33

## 2020-10-31 RX ADMIN — ONDANSETRON 4 MG: 2 INJECTION INTRAMUSCULAR; INTRAVENOUS at 20:42

## 2020-10-31 RX ADMIN — PIPERACILLIN AND TAZOBACTAM 3.38 G: 3; .375 INJECTION, POWDER, LYOPHILIZED, FOR SOLUTION INTRAVENOUS at 17:34

## 2020-10-31 RX ADMIN — DEXMEDETOMIDINE HYDROCHLORIDE 10 MCG: 100 INJECTION, SOLUTION, CONCENTRATE INTRAVENOUS at 11:22

## 2020-10-31 RX ADMIN — PHENYLEPHRINE HYDROCHLORIDE 100 MCG: 10 INJECTION INTRAVENOUS at 13:43

## 2020-10-31 RX ADMIN — MORPHINE SULFATE 1 MG: 2 INJECTION, SOLUTION INTRAMUSCULAR; INTRAVENOUS at 15:58

## 2020-10-31 RX ADMIN — FENTANYL CITRATE 50 MCG: 50 INJECTION, SOLUTION INTRAMUSCULAR; INTRAVENOUS at 14:41

## 2020-10-31 RX ADMIN — DEXMEDETOMIDINE HYDROCHLORIDE 10 MCG: 100 INJECTION, SOLUTION, CONCENTRATE INTRAVENOUS at 11:41

## 2020-10-31 RX ADMIN — SODIUM CHLORIDE, POTASSIUM CHLORIDE, SODIUM LACTATE AND CALCIUM CHLORIDE: 600; 310; 30; 20 INJECTION, SOLUTION INTRAVENOUS at 11:55

## 2020-10-31 RX ADMIN — DEXAMETHASONE SODIUM PHOSPHATE 4 MG: 4 INJECTION, SOLUTION INTRA-ARTICULAR; INTRALESIONAL; INTRAMUSCULAR; INTRAVENOUS; SOFT TISSUE at 11:15

## 2020-10-31 RX ADMIN — PIPERACILLIN SODIUM AND TAZOBACTAM SODIUM 3.38 G: 3; .375 INJECTION, POWDER, LYOPHILIZED, FOR SOLUTION INTRAVENOUS at 11:24

## 2020-10-31 RX ADMIN — SODIUM CHLORIDE, POTASSIUM CHLORIDE, SODIUM LACTATE AND CALCIUM CHLORIDE 125 ML/HR: 600; 310; 30; 20 INJECTION, SOLUTION INTRAVENOUS at 01:32

## 2020-10-31 RX ADMIN — FENTANYL CITRATE 50 MCG: 50 INJECTION, SOLUTION INTRAMUSCULAR; INTRAVENOUS at 14:46

## 2020-10-31 RX ADMIN — MIDAZOLAM HYDROCHLORIDE 2 MG: 2 INJECTION, SOLUTION INTRAMUSCULAR; INTRAVENOUS at 11:08

## 2020-10-31 RX ADMIN — MORPHINE SULFATE 1 MG: 2 INJECTION, SOLUTION INTRAMUSCULAR; INTRAVENOUS at 06:39

## 2020-10-31 RX ADMIN — MORPHINE SULFATE 1 MG: 2 INJECTION, SOLUTION INTRAMUSCULAR; INTRAVENOUS at 20:43

## 2020-10-31 RX ADMIN — DEXMEDETOMIDINE HYDROCHLORIDE 10 MCG: 100 INJECTION, SOLUTION, CONCENTRATE INTRAVENOUS at 11:27

## 2020-10-31 RX ADMIN — MORPHINE SULFATE 10 MG: 10 INJECTION, SOLUTION INTRAMUSCULAR; INTRAVENOUS at 11:12

## 2020-10-31 RX ADMIN — MORPHINE SULFATE 1 MG: 2 INJECTION, SOLUTION INTRAMUSCULAR; INTRAVENOUS at 18:30

## 2020-10-31 RX ADMIN — ONDANSETRON 4 MG: 2 INJECTION INTRAMUSCULAR; INTRAVENOUS at 00:06

## 2020-10-31 RX ADMIN — FENTANYL CITRATE 50 MCG: 50 INJECTION, SOLUTION INTRAMUSCULAR; INTRAVENOUS at 13:07

## 2020-10-31 RX ADMIN — FENTANYL CITRATE 50 MCG: 50 INJECTION, SOLUTION INTRAMUSCULAR; INTRAVENOUS at 11:12

## 2020-10-31 RX ADMIN — PROPOFOL 50 MG: 10 INJECTION, EMULSION INTRAVENOUS at 13:07

## 2020-10-31 RX ADMIN — DEXMEDETOMIDINE HYDROCHLORIDE 10 MCG: 100 INJECTION, SOLUTION, CONCENTRATE INTRAVENOUS at 11:14

## 2020-10-31 RX ADMIN — SUGAMMADEX 200 MG: 100 INJECTION, SOLUTION INTRAVENOUS at 13:33

## 2020-10-31 RX ADMIN — ROCURONIUM BROMIDE 10 MG: 10 SOLUTION INTRAVENOUS at 11:13

## 2020-10-31 RX ADMIN — MORPHINE SULFATE 4 MG: 4 INJECTION INTRAVENOUS at 00:06

## 2020-10-31 RX ADMIN — ROCURONIUM BROMIDE 40 MG: 10 SOLUTION INTRAVENOUS at 11:18

## 2020-10-31 RX ADMIN — PROPOFOL 200 MG: 10 INJECTION, EMULSION INTRAVENOUS at 11:12

## 2020-10-31 RX ADMIN — ROCURONIUM BROMIDE 10 MG: 10 SOLUTION INTRAVENOUS at 13:04

## 2020-10-31 RX ADMIN — SODIUM CHLORIDE, POTASSIUM CHLORIDE, SODIUM LACTATE AND CALCIUM CHLORIDE: 600; 310; 30; 20 INJECTION, SOLUTION INTRAVENOUS at 11:10

## 2020-10-31 RX ADMIN — SUCCINYLCHOLINE CHLORIDE 140 MG: 20 INJECTION, SOLUTION INTRAMUSCULAR; INTRAVENOUS at 11:13

## 2020-10-31 NOTE — BRIEF OP NOTE
Brief Postoperative Note    Patient: Janeth Heller  YOB: 1990  MRN: [id number]    Date of Procedure: 10/31/2020     Pre-Op Diagnosis: Appendicitis, unspecified appendicitis type [K37]    Post-Op Diagnosis: Same as preoperative diagnosis.       Procedure(s):  APPENDECTOMY LAPAROSCOPIC    Surgeon(s):  Sallie Gomez MD    Surgical Assistant: Ms. Arleth Gonsalez    Anesthesia: General     Estimated Blood Loss (mL): Minimal    Complications: None    Specimens:   ID Type Source Tests Collected by Time Destination   1 :  Frozen Section Appendix  Sallie Gomez MD 10/31/2020 1258 Pathology   1 :  Urine Granados Specimen CULTURE, URINE, CULTURE, ANAEROBIC Walter Templeton MD 10/31/2020 1314 Microbiology        Implants: * No implants in log *    Drains: * No LDAs found *    Findings: as above    Electronically Signed by Sandy Lundberg MD on 10/31/2020 at 1:30 PM

## 2020-10-31 NOTE — ROUTINE PROCESS
TRANSFER - OUT REPORT: 
 
Verbal report given to [facility] on [place]  being transferred to [facility] for routine post - op Report consisted of patients Situation, Background, Assessment and  
Recommendations(SBAR). Information from the following report(s) SBAR, OR Summary, Procedure Summary, Intake/Output and Dual Neuro Assessment was reviewed with the receiving nurse. Opportunity for questions and clarification was provided.    
 
Patient transported with: 
 Registered Nurse Zofia Roe and [facility] RN

## 2020-10-31 NOTE — ANESTHESIA POSTPROCEDURE EVALUATION
Procedure(s):  APPENDECTOMY LAPAROSCOPIC.     general    Anesthesia Post Evaluation      Multimodal analgesia: multimodal analgesia used between 6 hours prior to anesthesia start to PACU discharge  Patient location during evaluation: bedside  Patient participation: complete - patient participated  Level of consciousness: awake  Pain score: 1  Airway patency: patent  Anesthetic complications: no  Cardiovascular status: acceptable  Respiratory status: acceptable  Hydration status: acceptable  Post anesthesia nausea and vomiting:  none      INITIAL Post-op Vital signs:   Vitals Value Taken Time   /67 10/31/2020  2:15 PM   Temp 36.4 °C (97.6 °F) 10/31/2020  1:53 PM   Pulse 74 10/31/2020  2:15 PM   Resp 16 10/31/2020  2:15 PM   SpO2 100 % 10/31/2020  2:15 PM

## 2020-10-31 NOTE — ANESTHESIA PREPROCEDURE EVALUATION
Relevant Problems   No relevant active problems       Anesthetic History   No history of anesthetic complications            Review of Systems / Medical History  Patient summary reviewed, nursing notes reviewed and pertinent labs reviewed    Pulmonary  Within defined limits                 Neuro/Psych   Within defined limits           Cardiovascular  Within defined limits                     GI/Hepatic/Renal  Within defined limits              Endo/Other  Within defined limits           Other Findings   Comments: appendicitis         History reviewed. No pertinent past medical history. History reviewed. No pertinent surgical history.     No current outpatient medications    Current Facility-Administered Medications   Medication Dose Route Frequency    lactated Ringers infusion  125 mL/hr IntraVENous CONTINUOUS    piperacillin-tazobactam (ZOSYN) 3.375 g in 0.9% sodium chloride (MBP/ADV) 100 mL MBP  3.375 g IntraVENous Q8H    morphine injection 1 mg  1 mg IntraVENous Q3H PRN    ondansetron (ZOFRAN) injection 4 mg  4 mg IntraVENous Q6H PRN    cefOXitin (MEFOXIN) 2 g in 0.9% sodium chloride (MBP/ADV) 50 mL MBP  2 g IntraVENous ON CALL TO OR       Patient Vitals for the past 24 hrs:   Temp Pulse Resp BP SpO2   10/31/20 0639 37 °C (98.6 °F) 69 18 124/84 99 %   10/31/20 0030 36.8 °C (98.2 °F) 88 18 126/77 99 %   10/30/20 2138  94 18 125/88 99 %   10/30/20 1627 36.6 °C (97.9 °F) 71 17 (!) 144/79        Lab Results   Component Value Date/Time    WBC 9.4 10/30/2020 04:45 PM    HGB 15.6 10/30/2020 04:45 PM    HCT 46.6 10/30/2020 04:45 PM    PLATELET 280 27/39/9941 04:45 PM    MCV 85.7 10/30/2020 04:45 PM     Lab Results   Component Value Date/Time    Sodium 143 10/30/2020 04:45 PM    Potassium 3.5 10/30/2020 04:45 PM    Chloride 105 10/30/2020 04:45 PM    CO2 29 10/30/2020 04:45 PM    Anion gap 9 10/30/2020 04:45 PM    Glucose 106 (H) 10/30/2020 04:45 PM    BUN 9 10/30/2020 04:45 PM    Creatinine 1.14 10/30/2020 04:45 PM    BUN/Creatinine ratio 8 (L) 10/30/2020 04:45 PM    GFR est AA >60 10/30/2020 04:45 PM    GFR est non-AA >60 10/30/2020 04:45 PM    Calcium 9.1 10/30/2020 04:45 PM     No results found for: APTT, PTP, INR, INREXT  Lab Results   Component Value Date/Time    Glucose 106 (H) 10/30/2020 04:45 PM     Physical Exam    Airway  Mallampati: I  TM Distance: 4 - 6 cm  Neck ROM: normal range of motion   Mouth opening: Normal     Cardiovascular    Rhythm: regular  Rate: normal         Dental  No notable dental hx       Pulmonary  Breath sounds clear to auscultation               Abdominal  GI exam deferred       Other Findings            Anesthetic Plan    ASA: 1, emergent  Anesthesia type: general          Induction: Intravenous  Anesthetic plan and risks discussed with: Patient and Family

## 2020-10-31 NOTE — ED NOTES
Rounding, pt denies needs at present. Respirations even and unlabored, skin warm and dry to touch, a & o x 4, no acute distress. Awaiting transportation for inpatient admission.

## 2020-10-31 NOTE — H&P
[facility] SURGERY H&P           Chief Complaint: Abdominal pain x 1 day. History of Present Illness:    Mr. Yohannes Puri is a 31y.o. year old * male [facility] with 1 day history of mid abdominal pain that localised to right lower quadrant. In the [facility] he had CT scan of abdomen and pelvis that was suggestive of early appendicitis and patient was transferred to [facility] early this morning. He has been on antibiotics. No fever or chills. No nausea or vomiting. No blood in stool or black colored stool. Past Medical History: History reviewed. No pertinent past medical history. Past Surgical History: History reviewed. No pertinent surgical history. Allergy:  Allergies   Allergen Reactions    Sulfur Hives and Swelling       Social History:  reports that he has never smoked. He has never used smokeless tobacco. He reports previous alcohol use. He reports current drug use. Drug: Marijuana. Family History:  Family History   Family history unknown: Yes        Current Medications:  Current Facility-Administered Medications:     lactated Ringers infusion, 125 mL/hr, IntraVENous, CONTINUOUS, Salvatore Templeton MD, Last Rate: 125 mL/hr at 10/31/20 0132, 125 mL/hr at 10/31/20 0132    piperacillin-tazobactam (ZOSYN) 3.375 g in 0.9% sodium chloride (MBP/ADV) 100 mL MBP, 3.375 g, IntraVENous, Q8H, Salvatore Templeton MD, Last Rate: 25 mL/hr at 10/31/20 0133, 3.375 g at 10/31/20 0133    morphine injection 1 mg, 1 mg, IntraVENous, Q3H PRN, Salvatore Templeton MD, 1 mg at 10/31/20 0639    ondansetron (ZOFRAN) injection 4 mg, 4 mg, IntraVENous, Q6H PRN, Alex Amaro MD     Immunization History: There is no immunization history on file for this patient. Complete    Review of Systems:     Constitutional:  no fever,  no chills,  no sweats, No weakness, No fatigue, No decreased activity.   Eye: No recent visual problem, No icterus, No discharge, No double vision. Ear/Nose/Mouth/Throat: No decreased hearing, No ear pain, No nasal congestion, No sore throat. Respiratory: No shortness of breath, No cough, No sputum production, No hemoptysis, No wheezing, No cyanosis. Cardiovascular: No chest pain, No palpitations, No bradycardia, No tachycardia, No peripheral edema, No syncope. Gastrointestinal: No nausea,  No vomiting, No diarrhea, No constipation, No heartburn,  abdominal pain. Genitourinary: No dysuria, No hematuria, No change in urine stream, No urethral discharge, No lesions. Hematology/Lymphatics: No bruising tendency, No bleeding tendency, No petechiae, No swollen lymph glands. Endocrine: No excessive thirst, No polyuria, No cold intolerance, No heat intolerance, No excessive hunger. Immunologic: Not immunocompromised, No recurrent fevers, No recurrent infections. Musculoskeletal: No back pain, No neck pain, No joint pain, No muscle pain, No claudication, No decreased range of motion, No trauma. Integumentary: No rash, No pruritus, No abrasions. Neurologic: Alert and oriented X4, No abnormal balance, No headache, No confusion, No numbness, No tingling. Psychiatric: No anxiety, No depression, No bhumi. Physical Exam:     Vitals & Measurements:     Wt Readings from Last 3 Encounters:   10/30/20 95.3 kg (210 lb)   04/05/17 104.3 kg (230 lb)     Temp Readings from Last 3 Encounters:   10/31/20 98.2 °F (36.8 °C)   04/05/17 98 °F (36.7 °C)     BP Readings from Last 3 Encounters:   10/31/20 124/84   04/05/17 144/78     Pulse Readings from Last 3 Encounters:   10/31/20 69   04/05/17 77      Ht Readings from Last 3 Encounters:   10/30/20 5' 7\" (1.702 m)   04/05/17 5' 7\" (1.702 m)          General: well appearing, no acute distress  Head: Normal  Face: Nornal  HEENT: atraumatic, PERRLA, moist mucosa, normal pharynx, normal tonsils and adenoids, normal tongue, no fluid in sinuses  Neck: Trachea midline, no carotid bruit, no masses  Chest: Normal.  Respiratory: Normal chest wall expansion, CTA B, no r/r/w, no rubs  Cardiovascular: RRR, no m/r/g, Normal S1 and S2  Abdomen: Soft, right lower quadrant & suprapubic tenderness, non-distended, normal bowel sounds in all quadrants, no hepatosplenomegaly, no tympany. Incision scar: None  Genitourinary: No inguinal hernia, normal external gentalia, Testis & scrotum normal, no renal angle tenderness  Rectal: deferred  Musculoskeletal: normal ROM in upper and lower extremities, No joint swelling.   Integumentary: Warm, dry, and pink, with no rash, purpura, or petechia  Heme/Lymph: No lymphadenopathy, no bruises  Neurological:Cranial Nerves II-XII grossly intact, no gross motor or sensory deficit  Psychiatric: Cooperative with normal mood, affect, and cognition      Laboratory Values:   Recent Results (from the past 24 hour(s))   URINALYSIS W/MICROSCOPIC    Collection Time: 10/30/20  4:45 PM   Result Value Ref Range    Color Yellow/Straw      Appearance Clear Clear      Specific gravity 1.010 1.003 - 1.030      pH (UA) 8.0 5.0 - 8.0      Protein Negative Negative mg/dL    Glucose Negative Negative mg/dL    Ketone Negative Negative mg/dL    Bilirubin Negative Negative      Blood Negative Negative      Urobilinogen 0.1 (L) 0.2 - 1.0 EU/dL    Nitrites Negative Negative      Leukocyte Esterase Negative Negative      WBC 0-4 0 - 4 /hpf    RBC 0-5 0 - 5 /hpf    Epithelial cells Few Few /lpf    Bacteria Negative Negative /hpf   CBC WITH AUTOMATED DIFF    Collection Time: 10/30/20  4:45 PM   Result Value Ref Range    WBC 9.4 4.1 - 11.1 K/uL    RBC 5.44 4.10 - 5.70 M/uL    HGB 15.6 12.1 - 17.0 g/dL    HCT 46.6 36.6 - 50.3 %    MCV 85.7 80.0 - 99.0 FL    MCH 28.7 26.0 - 34.0 PG    MCHC 33.5 30.0 - 36.5 g/dL    RDW 12.6 11.5 - 14.5 %    PLATELET 015 166 - 921 K/uL    MPV 10.4 8.9 - 12.9 FL    NEUTROPHILS 80 (H) 32 - 75 %    LYMPHOCYTES 14 12 - 49 %    MONOCYTES 6 5 - 13 %    EOSINOPHILS 0 0 - 7 %    BASOPHILS 0 0 - 1 %    IMMATURE GRANULOCYTES 0 0.0 - 0.5 %    ABS. NEUTROPHILS 7.5 1.8 - 8.0 K/UL    ABS. LYMPHOCYTES 1.4 0.8 - 3.5 K/UL    ABS. MONOCYTES 0.5 0.0 - 1.0 K/UL    ABS. EOSINOPHILS 0.0 0.0 - 0.4 K/UL    ABS. BASOPHILS 0.0 0.0 - 0.1 K/UL    ABS. IMM. GRANS. 0.0 0.00 - 0.04 K/UL    DF AUTOMATED     METABOLIC PANEL, COMPREHENSIVE    Collection Time: 10/30/20  4:45 PM   Result Value Ref Range    Sodium 143 136 - 145 mmol/L    Potassium 3.5 3.5 - 5.1 mmol/L    Chloride 105 97 - 108 mmol/L    CO2 29 21 - 32 mmol/L    Anion gap 9 5 - 15 mmol/L    Glucose 106 (H) 65 - 100 mg/dL    BUN 9 6 - 20 mg/dL    Creatinine 1.14 0.70 - 1.30 mg/dL    BUN/Creatinine ratio 8 (L) 12 - 20      GFR est AA >60 >60 ml/min/1.73m2    GFR est non-AA >60 >60 ml/min/1.73m2    Calcium 9.1 8.5 - 10.1 mg/dL    Bilirubin, total 0.4 0.2 - 1.0 mg/dL    AST (SGOT) 17 15 - 37 U/L    ALT (SGPT) 31 12 - 78 U/L    Alk. phosphatase 66 45 - 117 U/L    Protein, total 8.3 (H) 6.4 - 8.2 g/dL    Albumin 3.9 3.5 - 5.0 g/dL    Globulin 4.4 (H) 2.0 - 4.0 g/dL    A-G Ratio 0.9 (L) 1.1 - 2.2     LIPASE    Collection Time: 10/30/20  5:15 PM   Result Value Ref Range    Lipase 129 73 - 393 U/L         CT ABD PELV WO CONT   Final Result   IMPRESSION: Findings most likely represent early acute appendicitis without   perforation or abscess. Dose reduction: All CT scans at this facility are performed using radiation dose   reduction optimization techniques as appropriate to a performed exam, including   the following: Automated exposure control (8 cc), adjustment of the MAA and/or   KUB according to the patient's size, or the patient's use of iterative   reconstruction techniques (ASIR). XR ABD FLAT/ ERECT   Final Result          Assessment:  Problem List Items Addressed This Visit        Digestive    Acute appendicitis - Primary           Plan:    1. Admission  2. Diet: NPO  3. IV fluids  4. SCD  5. IS  6. Pain medications  7. Antibiotics: Zosyn  8.  Nausea medication  9. Granados: in OR  10. NG: None  11. Labs  12. Consult: None  13. OR  14. Procedure/Surgery: Laparoscopic Appendectomy possible open  15. Risk, benefits and complications including bleeding, infection, dvt, pe, mi, stroke, sepsis, injury to bowel, bladder, ureter, bowel obstruction, evisceration, dehiscence, discussed, questions answered, patient clearly understands and agrees with plan. All his questions were answered to his satisfaction. RN was present during entire conversation. Thank you for the consultation & allowing me to participate in the care of this patient.

## 2020-11-01 VITALS
TEMPERATURE: 97.6 F | WEIGHT: 210 LBS | SYSTOLIC BLOOD PRESSURE: 124 MMHG | BODY MASS INDEX: 32.96 KG/M2 | HEIGHT: 67 IN | DIASTOLIC BLOOD PRESSURE: 77 MMHG | OXYGEN SATURATION: 99 % | RESPIRATION RATE: 20 BRPM | HEART RATE: 74 BPM

## 2020-11-01 LAB
APPEARANCE UR: CLEAR
BACTERIA URNS QL MICRO: NEGATIVE /HPF
BILIRUB UR QL: NEGATIVE
COLOR UR: NORMAL
GLUCOSE UR STRIP.AUTO-MCNC: NEGATIVE MG/DL
HGB UR QL STRIP: NEGATIVE
KETONES UR QL STRIP.AUTO: NEGATIVE MG/DL
LEUKOCYTE ESTERASE UR QL STRIP.AUTO: NEGATIVE
MUCOUS THREADS URNS QL MICRO: NORMAL /LPF
NITRITE UR QL STRIP.AUTO: NEGATIVE
PH UR STRIP: 7 [PH] (ref 5–8)
PROT UR STRIP-MCNC: NEGATIVE MG/DL
RBC #/AREA URNS HPF: NORMAL /HPF (ref 0–5)
SP GR UR REFRACTOMETRY: 1.01 (ref 1–1.03)
UROBILINOGEN UR QL STRIP.AUTO: 0.1 EU/DL (ref 0.1–1)
WBC URNS QL MICRO: NORMAL /HPF (ref 0–4)

## 2020-11-01 PROCEDURE — 87086 URINE CULTURE/COLONY COUNT: CPT

## 2020-11-01 PROCEDURE — 81001 URINALYSIS AUTO W/SCOPE: CPT

## 2020-11-01 PROCEDURE — 74011000258 HC RX REV CODE- 258: Performed by: SURGERY

## 2020-11-01 PROCEDURE — 74011250636 HC RX REV CODE- 250/636: Performed by: SURGERY

## 2020-11-01 RX ADMIN — SODIUM CHLORIDE, POTASSIUM CHLORIDE, SODIUM LACTATE AND CALCIUM CHLORIDE 125 ML/HR: 600; 310; 30; 20 INJECTION, SOLUTION INTRAVENOUS at 04:10

## 2020-11-01 RX ADMIN — PIPERACILLIN AND TAZOBACTAM 3.38 G: 3; .375 INJECTION, POWDER, LYOPHILIZED, FOR SOLUTION INTRAVENOUS at 08:45

## 2020-11-01 RX ADMIN — MORPHINE SULFATE 1 MG: 2 INJECTION, SOLUTION INTRAMUSCULAR; INTRAVENOUS at 08:46

## 2020-11-01 RX ADMIN — MORPHINE SULFATE 1 MG: 2 INJECTION, SOLUTION INTRAMUSCULAR; INTRAVENOUS at 13:00

## 2020-11-01 RX ADMIN — MORPHINE SULFATE 2 MG: 2 INJECTION, SOLUTION INTRAMUSCULAR; INTRAVENOUS at 04:11

## 2020-11-01 RX ADMIN — PIPERACILLIN AND TAZOBACTAM 3.38 G: 3; .375 INJECTION, POWDER, LYOPHILIZED, FOR SOLUTION INTRAVENOUS at 04:14

## 2020-11-01 RX ADMIN — MORPHINE SULFATE 2 MG: 2 INJECTION, SOLUTION INTRAMUSCULAR; INTRAVENOUS at 00:08

## 2020-11-01 NOTE — PROGRESS NOTES
Patient discharged to home self care. Vital signs are stable at this time. IV catheter intact upon removal. All instructions and prescriptions handed over to patient, patient verbalized understanding. Patient escorted downstairs in a wheelchair and left facility in a Private vehicle accompanied by mother.

## 2020-11-01 NOTE — PROGRESS NOTES
POD # 1    Chief Complaint:      Subjective:  Mr. Juani Carpenter is a 31y.o. year old  male S/P Laparoscopic Appendectomy. Feels ok. Tolerating diet. Review of Systems:   Constitutional:  no fever,  no chills,  no sweats, No weakness, No fatigue, No decreased activity. Respiratory: No shortness of breath, No cough, No sputum production, No hemoptysis, No wheezing, No cyanosis. Cardiovascular: No chest pain, No palpitations, No bradycardia, No tachycardia, No peripheral edema, No syncope. Gastrointestinal: No nausea,  No vomiting, No diarrhea, No constipation, No heartburn, No abdominal pain. Genitourinary: No dysuria, No hematuria, No change in urine stream, No urethral discharge, No lesions. Musculoskeletal: No back pain, No neck pain, No joint pain, No muscle pain, No claudication, No decreased range of motion, No trauma. Integumentary: No rash, No pruritus, No abrasions. Neurologic: Alert and oriented X4, No abnormal balance, No headache, No confusion, No numbness, No tingling. Psychiatric: No anxiety, No depression, No bhumi. Physical Exam:     Vitals & Measurements:     Wt Readings from Last 3 Encounters:   10/30/20 95.3 kg (210 lb)   04/05/17 104.3 kg (230 lb)     Temp Readings from Last 3 Encounters:   11/01/20 97.6 °F (36.4 °C)   04/05/17 98 °F (36.7 °C)     BP Readings from Last 3 Encounters:   11/01/20 124/77   04/05/17 144/78     Pulse Readings from Last 3 Encounters:   11/01/20 74   04/05/17 77      Ht Readings from Last 3 Encounters:   10/30/20 5' 7\" (1.702 m)   04/05/17 5' 7\" (1.702 m)      Date 10/31/20 0700 - 11/01/20 0659 11/01/20 0700 - 11/02/20 0659   Shift 0418-0818 5577-2067 24 Hour Total 8688-3234 6013-6738 24 Hour Total   INTAKE   I.V.(mL/kg/hr) 1500(1.3)  1500(0.7)        Volume (lactated Ringers infusion) 1500  1500      Shift Total(mL/kg) 1500(15.7)  1500(15.7)      OUTPUT   Urine(mL/kg/hr) 500(0.4)  500(0.2)        Urine Output 500  500      Blood 25  25 Estimated Blood Loss 25  25      Shift Total(mL/kg) 525(5.5)  525(5.5)        975      Weight (kg) 95.3 95.3 95.3 95.3 95.3 95.3       General: well appearing, no acute distress  Respiratory: normal chest wall expansion, CTA B, no r/r/w, no rubs  Cardiovascular: RRR, no m/r/g, Normal S1 and S2  Abdomen: Soft, incision site tenderness, non-distended, normal bowel sounds in all quadrants, no hepatosplenomegaly, no tympany. Incision scar: clean and dry. Genitourinary: No inguinal hernia, normal external gentalia, Testis & scrotum normal, no renal angle tenderness  Musculoskeletal: normal ROM in upper and lower extremities  Integumentary: warm, dry, and pink, with no rash, purpura, or petechia  Neurological:Cranial Nerves II-XII grossly intact, No sensory or motor deficit  Psychiatric: Cooperative with normal mood, affect, and cognition    Laboratory Values:   Recent Results (from the past 24 hour(s))   URINALYSIS W/MICROSCOPIC    Collection Time: 11/01/20 10:00 AM   Result Value Ref Range    Color Yellow/Straw      Appearance Clear Clear      Specific gravity 1.012 1.003 - 1.030      pH (UA) 7.0 5.0 - 8.0      Protein Negative Negative mg/dL    Glucose Negative Negative mg/dL    Ketone Negative Negative mg/dL    Bilirubin Negative Negative      Blood Negative Negative      Urobilinogen 0.1 0.1 - 1.0 EU/dL    Nitrites Negative Negative      Leukocyte Esterase Negative Negative      WBC 5-10 0 - 4 /hpf    RBC 0-5 0 - 5 /hpf    Bacteria Negative Negative /hpf    Mucus Trace /lpf         Radiology:   CT ABD PELV WO CONT   Final Result   IMPRESSION: Findings most likely represent early acute appendicitis without   perforation or abscess.             Dose reduction: All CT scans at this facility are performed using radiation dose   reduction optimization techniques as appropriate to a performed exam, including   the following: Automated exposure control (8 cc), adjustment of the MAA and/or   KUB according to the patient's size, or the patient's use of iterative   reconstruction techniques (ASIR). XR ABD FLAT/ ERECT   Final Result            Assessment:  Problem List Items Addressed This Visit        Digestive    Acute appendicitis - Primary       S/P Laparoscopic appendectomy    Plan:  D/C home  Instructions given. Plan discussed with patient and answered all their questions. Thank you for allowing me to participate in the care of this patient.

## 2020-11-01 NOTE — OP NOTES
[address] [facility]  OPERATIVE REPORT    Name:  Catalino Velazquez  MR#:  [id number]  :  1990  ACCOUNT #:  [de-identified]  DATE OF SERVICE:  10/31/2020      PREOPERATIVE DIAGNOSIS:  Acute appendicitis. POSTOPERATIVE DIAGNOSIS:  Acute appendicitis. PROCEDURE PERFORMED:  Laparoscopic appendectomy. SURGEON:  Chandrika Massey MD    ASSISTANT:  Ms. Susan Hwang    ANESTHESIA:  General anesthesia/local anesthesia. ANESTHESIOLOGIST:  America Ramos MD    NURSE ANESTHETIST:  Mr. Garth Williamson    COMPLICATIONS: none    SPECIMENS REMOVED:  appendix    IMPLANTS:  none. ESTIMATED BLOOD LOSS:  minimal    INDICATIONS:  The patient is a 29-year-old gentleman who presented to the emergency room from [facility] because of acute appendicitis. He was admitted. The CT scan of the abdomen and pelvis was consistent with acute appendicitis. He also had right lower quadrant tenderness consistent with acute appendicitis, hence I recommended laparoscopic appendectomy, possible open. Risks, benefits, and complications were discussed and consent obtained. This included bleeding, infection, possible DVT, PE, possible cardiac arrhythmia as well as myocardial infarction, possible stroke, possible sepsis, possible injury to the bowel or bladder, possible leak, possible abscess, evisceration, bowel obstruction, possible conversion to open was all discussed in detail and consent obtained. PROCEDURE:  The patient was taken to the operating room. The patient was laid supine. The patient received prophylactic dose of antibiotic. The patient had TEDs and SCDs applied on both lower extremities. After intubation, a Granados catheter was placed under aseptic precaution. The abdomen was shaved, prepped, and draped in usual sterile fashion. Timeout was completed. Local anesthesia was infiltrated. Infraumbilical curvilinear incision was placed.   We attempted to place an Optiview, but the camera stopped, did not work. There was some glitch in the monitor system that we could not fix, so it delayed the operating time and starting of the operation in spite of the timeout. We attempted to call the [employer] and [employer]. We were not able to get hold off them initially. We talked to the OR staff members and tried to fix, but could not fix the problem. After some time wasted, we did not want to open this patient, convert it to open, so we attempted the arthroscopic camera and was able to use it. We were able to safely get into the abdomen using an Optiview technique with a 5 mm port in the umbilical site, so we had to use the arthroscopic monitor and camera for this purpose. Then under direct vision, we went and placed a 5 mm suprapubic port and a 12 mm left lower quadrant port. The right lower quadrant was seen. The cecum was identified. There was inflamed large appendix, which was somewhat retrocecal, it was somewhat postileal attached to the right psoas. We had to dissect the inflamed appendix gently making sure everything was okay. We identified the right ureter and safeguarded it. We had to mobilize the cecum in order to get this long appendix out. Then, we took down the mesoappendix with a laparoscopic ligation device taking care that right ureter was carefully safeguarded and preserved in its entirety. Once we were able to reach the base of the appendix using laparoscopic LigaSure over the mesoappendix, we went and then applied a Endo-ASHU 35 mm white load across the base of the appendix and fired it across making sure there was a good row of staples and making sure there was no involvement of the terminal ileum as well. Then, the appendix was placed in EndoCatch bag and retrieved out of the peritoneal cavity from the left lower quadrant port. Then, we irrigated this pelvis and aspirated and hemostasis was satisfactory. Once we rechecked again, everything looked in order.   Then we went and evacuated the carbon dioxide pneumoperitoneum. All the ports were removed. The fascia in the left lower quadrant port and the umbilical site was closed using 0 Vicryl in the form of simple figure-of-eight stitches. All subcutaneous tissues were closed using 3-0 Vicryl subcutaneous stitches. All the skin incisions were closed using a 4-0 Monocryl continuous subcuticular stitches. Dermabond was applied. Additional local anesthesia was infiltrated. The patient tolerated the procedure very well. There were no complications. Estimated blood loss was minimal.  The patient was extubated and Granados catheter was removed and transferred to the recovery room in stable condition. All counts were correct.       MD LATISHA [email or web address]  D:  10/31/2020 13:46  T:  10/31/2020 23:04  JOB #:  [id number]  CC:  Berny Resendez MD

## 2020-11-02 LAB
BACTERIA SPEC CULT: NORMAL
SPECIAL REQUESTS,SREQ: NORMAL

## 2022-03-19 PROBLEM — K35.80 ACUTE APPENDICITIS: Status: ACTIVE | Noted: 2020-10-30

## (undated) DEVICE — TROCAR: Brand: KII SLEEVE

## (undated) DEVICE — ULNAR NERVE PROTECTOR FOAM POSITIONER: Brand: CARDINAL HEALTH

## (undated) DEVICE — [HIGH FLOW INSUFFLATOR,  DO NOT USE IF PACKAGE IS DAMAGED,  KEEP DRY,  KEEP AWAY FROM SUNLIGHT,  PROTECT FROM HEAT AND RADIOACTIVE SOURCES.]: Brand: PNEUMOSURE

## (undated) DEVICE — STERILE POLYISOPRENE POWDER-FREE SURGICAL GLOVES WITH EMOLLIENT COATING: Brand: PROTEXIS

## (undated) DEVICE — SEALER ENDOSCP L37CM NANO COAT BLNT TIP LAP DIV

## (undated) DEVICE — TROCAR: Brand: KII FIOS FIRST ENTRY

## (undated) DEVICE — EVAC SMOKE SEECLEAR XCL -- SEE CLEAR

## (undated) DEVICE — LAPAROSCOPIC CHOLE PACK: Brand: MEDLINE INDUSTRIES, INC.

## (undated) DEVICE — GARMENT CMPR STD UNV CALF FLWT -- RN VENTILATE NS DISP 17- IN

## (undated) DEVICE — STERILE POLYISOPRENE POWDER-FREE SURGICAL GLOVES: Brand: PROTEXIS

## (undated) DEVICE — STAPLER INT L28CM 45MM B FRM PWR SHT ECHELON FLX

## (undated) DEVICE — E-Z CLEAN, PTFE COATED, ELECTROSURGICAL LAPAROSCOPIC ELECTRODE, L-HOOK, 33 CM., SINGLE-USE, FOR USE WITH HAND CONTROL PENCIL: Brand: MEGADYNE

## (undated) DEVICE — VISUALIZATION SYSTEM: Brand: CLEARIFY

## (undated) DEVICE — DERMABOND SKIN ADH 0.7ML -- DERMABOND ADVANCED 12/BX

## (undated) DEVICE — SUT MONOCRYL PLUS UD 4-0 --

## (undated) DEVICE — PREP SKN CHLRAPRP 26ML TNT -- CONVERT TO ITEM 373320

## (undated) DEVICE — TOWEL,OR,DSP,ST,BLUE,DLX,6/PK,12PK/CS: Brand: MEDLINE

## (undated) DEVICE — TURNOVER KIT OR CUST CMB FLD GEN LF

## (undated) DEVICE — SUTURE SZ 0 27IN 5/8 CIR UR-6  TAPER PT VIOLET ABSRB VICRYL J603H

## (undated) DEVICE — 3-0 COATED VICRYL PLUS UNDYED 1X27" SH --

## (undated) DEVICE — CORD ES L10FT MPLR LAP

## (undated) DEVICE — TISSUE RETRIEVAL SYSTEM: Brand: INZII RETRIEVAL SYSTEM

## (undated) DEVICE — SOL INJ SOD CL 0.9% 1000ML BG --

## (undated) DEVICE — SOL IRR NACL 0.9% 500ML POUR --

## (undated) DEVICE — ARMBOARD FOAM POSITIONER: Brand: CARDINAL HEALTH

## (undated) DEVICE — LAPAROSCOPIC SCISSORS: Brand: EPIX LAPAROSCOPIC SCISSORS

## (undated) DEVICE — DRAPE,REIN 53X77,STERILE: Brand: MEDLINE

## (undated) DEVICE — 2, DISPOSABLE SUCTION/IRRIGATOR WITHOUT DISPOSABLE TIP: Brand: STRYKEFLOW

## (undated) DEVICE — BASIC SINGLE BASIN-LF: Brand: MEDLINE INDUSTRIES, INC.

## (undated) DEVICE — INTENDED FOR TISSUE SEPARATION, AND OTHER PROCEDURES THAT REQUIRE A SHARP SURGICAL BLADE TO PUNCTURE OR CUT.: Brand: BARD-PARKER SAFETY BLADES SIZE 11, STERILE